# Patient Record
Sex: MALE | Race: WHITE
[De-identification: names, ages, dates, MRNs, and addresses within clinical notes are randomized per-mention and may not be internally consistent; named-entity substitution may affect disease eponyms.]

---

## 2019-11-11 ENCOUNTER — HOSPITAL ENCOUNTER (EMERGENCY)
Dept: HOSPITAL 26 - MED | Age: 44
Discharge: ANOTHER HOSPITAL | End: 2019-11-11
Payer: COMMERCIAL

## 2019-11-11 VITALS — WEIGHT: 214 LBS | BODY MASS INDEX: 28.99 KG/M2 | HEIGHT: 72 IN

## 2019-11-11 VITALS — SYSTOLIC BLOOD PRESSURE: 123 MMHG | DIASTOLIC BLOOD PRESSURE: 80 MMHG

## 2019-11-11 VITALS — DIASTOLIC BLOOD PRESSURE: 85 MMHG | SYSTOLIC BLOOD PRESSURE: 143 MMHG

## 2019-11-11 DIAGNOSIS — I10: ICD-10-CM

## 2019-11-11 DIAGNOSIS — R07.89: ICD-10-CM

## 2019-11-11 DIAGNOSIS — R00.2: Primary | ICD-10-CM

## 2019-11-11 DIAGNOSIS — F41.9: ICD-10-CM

## 2019-11-11 LAB
ALBUMIN FLD-MCNC: 3.5 G/DL (ref 3.4–5)
ANION GAP SERPL CALCULATED.3IONS-SCNC: 14.7 MMOL/L (ref 8–16)
AST SERPL-CCNC: 25 U/L (ref 15–37)
BASOPHILS # BLD AUTO: 0 K/UL (ref 0–0.22)
BASOPHILS NFR BLD AUTO: 0.6 % (ref 0–2)
BILIRUB SERPL-MCNC: 0.5 MG/DL (ref 0–1)
BUN SERPL-MCNC: 12 MG/DL (ref 7–18)
CHLORIDE SERPL-SCNC: 106 MMOL/L (ref 98–107)
CO2 SERPL-SCNC: 24.1 MMOL/L (ref 21–32)
CREAT SERPL-MCNC: 0.8 MG/DL (ref 0.7–1.3)
EOSINOPHIL # BLD AUTO: 0.1 K/UL (ref 0–0.4)
EOSINOPHIL NFR BLD AUTO: 2 % (ref 0–4)
ERYTHROCYTE [DISTWIDTH] IN BLOOD BY AUTOMATED COUNT: 14.2 % (ref 11.6–13.7)
GFR SERPL CREATININE-BSD FRML MDRD: 136 ML/MIN (ref 90–?)
GLUCOSE SERPL-MCNC: 107 MG/DL (ref 74–106)
HCT VFR BLD AUTO: 42.6 % (ref 36–52)
HGB BLD-MCNC: 14.2 G/DL (ref 12–18)
LYMPHOCYTES # BLD AUTO: 1.1 K/UL (ref 2–11.5)
LYMPHOCYTES NFR BLD AUTO: 17.8 % (ref 20.5–51.1)
MCH RBC QN AUTO: 27 PG (ref 27–31)
MCHC RBC AUTO-ENTMCNC: 33 G/DL (ref 33–37)
MCV RBC AUTO: 79.7 FL (ref 80–94)
MONOCYTES # BLD AUTO: 0.3 K/UL (ref 0.8–1)
MONOCYTES NFR BLD AUTO: 5.3 % (ref 1.7–9.3)
NEUTROPHILS # BLD AUTO: 4.7 K/UL (ref 1.8–7.7)
NEUTROPHILS NFR BLD AUTO: 74.3 % (ref 42.2–75.2)
PLATELET # BLD AUTO: 238 K/UL (ref 140–450)
POTASSIUM SERPL-SCNC: 3.8 MMOL/L (ref 3.5–5.1)
RBC # BLD AUTO: 5.35 MIL/UL (ref 4.2–6.1)
SODIUM SERPL-SCNC: 141 MMOL/L (ref 136–145)
WBC # BLD AUTO: 6.4 K/UL (ref 4.8–10.8)

## 2019-11-11 PROCEDURE — 93005 ELECTROCARDIOGRAM TRACING: CPT

## 2019-11-11 PROCEDURE — 99284 EMERGENCY DEPT VISIT MOD MDM: CPT

## 2019-11-11 PROCEDURE — 84484 ASSAY OF TROPONIN QUANT: CPT

## 2019-11-11 PROCEDURE — 85025 COMPLETE CBC W/AUTO DIFF WBC: CPT

## 2019-11-11 PROCEDURE — 71045 X-RAY EXAM CHEST 1 VIEW: CPT

## 2019-11-11 PROCEDURE — 36415 COLL VENOUS BLD VENIPUNCTURE: CPT

## 2019-11-11 PROCEDURE — 80053 COMPREHEN METABOLIC PANEL: CPT

## 2019-11-11 NOTE — NUR
Patient to be transferred to [facility].  Is being transferred due to CHEST 
PAIN.  Receiving facility has accepting physician and available space. ER 
physician has signed transfer form.  Patient or responsible party has agreed to 
transfer and signed form.  Patient belongings inventoried and will be sent with 
patient.  Copy of nursing notes, lab reports, EKG, Physicians Orders and X-rays 
to be sent with patient.  Report called to KATHARINE at receiving facility.  [facility] 
ambulance service has been called for transfer.

## 2019-11-11 NOTE — NUR
PT. APPEARED ANXIOUS WITH RAPID BREATHING. PERFORMED BREATHING EXERCISES WITH 
PATIENT. PATIENT STATED, "I FEEL BETTER NOW". HR IS 90; RR:17; SPO2 98%. WILL 
CONTINUE TO MONITOR.

## 2019-11-11 NOTE — NUR
45 Y/O MALE C/O STRONG PALPITATIONS AND DIFFICULTY BREATHING S/P MILD EXERCISE 
MINS PTA. . PATIENT STATES, " I WAS WORKING OUT AND FELT STRONG 
PALPATIONS AND STARTED FEELING SOB, FELT NAUSEATED AND A LITTLE DIZZY. I 
NOTICED  UPPER/LOWER EXTREMITY TINGLINESS; PRESSURE ON MY SOLAR PLEXUS, AND 
THIS WAS JUST WITH MILD EXERCISE." NO PAIN NOTED. PATIENT A/O X 4 AND FOLLOWS 
COMMANDS. BREATHING IS UNLABORED AND SYMMETRICAL. RR IS 16; 98% ON RA. PULSE IS 
99BPM AT THIS TIME. ERMD MADE AWARE OF STATUS. SIDE RAILSX1. PLACED ON MONITOR. 
WILL CONTINUE TO MONITOR.



HX- HTN, ANXIETY

NKDA

RX: LOSARTAN; AMLODIPINE

## 2022-03-13 ENCOUNTER — HOSPITAL ENCOUNTER (EMERGENCY)
Dept: HOSPITAL 26 - MED | Age: 47
Discharge: HOME | End: 2022-03-13
Payer: SELF-PAY

## 2022-03-13 VITALS — BODY MASS INDEX: 31.15 KG/M2 | WEIGHT: 230 LBS | HEIGHT: 72 IN

## 2022-03-13 VITALS — DIASTOLIC BLOOD PRESSURE: 90 MMHG | SYSTOLIC BLOOD PRESSURE: 160 MMHG

## 2022-03-13 VITALS — DIASTOLIC BLOOD PRESSURE: 78 MMHG | SYSTOLIC BLOOD PRESSURE: 151 MMHG

## 2022-03-13 DIAGNOSIS — I10: ICD-10-CM

## 2022-03-13 DIAGNOSIS — R07.89: Primary | ICD-10-CM

## 2022-03-13 DIAGNOSIS — Z79.899: ICD-10-CM

## 2022-03-13 DIAGNOSIS — R06.02: ICD-10-CM

## 2022-03-13 DIAGNOSIS — R11.0: ICD-10-CM

## 2022-03-13 PROCEDURE — 96374 THER/PROPH/DIAG INJ IV PUSH: CPT

## 2022-03-13 PROCEDURE — 71045 X-RAY EXAM CHEST 1 VIEW: CPT

## 2022-03-13 PROCEDURE — 99283 EMERGENCY DEPT VISIT LOW MDM: CPT

## 2022-03-13 NOTE — NUR
47 Y.O M BIB [facility] WITH C/O CHEST PAIN "PRESSURE IN THE SOLAR PLEXUS" THAT IS NOT 
RADIATING WITH PAIN 8/10 WITH S.O.B. 



PMH: PANIC DISORDER, HTN, PRE-DIABETIC

NKA

## 2022-10-12 ENCOUNTER — HOSPITAL ENCOUNTER (EMERGENCY)
Dept: HOSPITAL 26 - MED | Age: 47
Discharge: HOME | End: 2022-10-12
Payer: MEDICAID

## 2022-10-12 VITALS — SYSTOLIC BLOOD PRESSURE: 99 MMHG | DIASTOLIC BLOOD PRESSURE: 60 MMHG

## 2022-10-12 VITALS — WEIGHT: 218 LBS | HEIGHT: 72 IN | BODY MASS INDEX: 29.53 KG/M2

## 2022-10-12 VITALS — DIASTOLIC BLOOD PRESSURE: 111 MMHG | SYSTOLIC BLOOD PRESSURE: 160 MMHG

## 2022-10-12 DIAGNOSIS — Z79.899: ICD-10-CM

## 2022-10-12 DIAGNOSIS — R51.9: Primary | ICD-10-CM

## 2022-10-12 DIAGNOSIS — R06.02: ICD-10-CM

## 2022-10-12 DIAGNOSIS — R11.0: ICD-10-CM

## 2022-10-12 DIAGNOSIS — I10: ICD-10-CM

## 2022-10-12 LAB
ANION GAP SERPL CALCULATED.3IONS-SCNC: 12.5 MMOL/L (ref 8–16)
BASOPHILS # BLD AUTO: 0 K/UL (ref 0–0.22)
BASOPHILS NFR BLD AUTO: 0.2 % (ref 0–2)
BUN SERPL-MCNC: 17 MG/DL (ref 7–18)
CHLORIDE SERPL-SCNC: 106 MMOL/L (ref 98–107)
CO2 SERPL-SCNC: 25.5 MMOL/L (ref 21–32)
CREAT SERPL-MCNC: 1 MG/DL (ref 0.6–1.3)
EOSINOPHIL # BLD AUTO: 0 K/UL (ref 0–0.4)
EOSINOPHIL NFR BLD AUTO: 0.6 % (ref 0–4)
ERYTHROCYTE [DISTWIDTH] IN BLOOD BY AUTOMATED COUNT: 14.2 % (ref 11.6–13.7)
GFR SERPL CREATININE-BSD FRML MDRD: 104 ML/MIN (ref 90–?)
GLUCOSE SERPL-MCNC: 113 MG/DL (ref 74–106)
HCT VFR BLD AUTO: 46.5 % (ref 36–52)
HGB BLD-MCNC: 15.8 G/DL (ref 12–18)
LYMPHOCYTES # BLD AUTO: 1.2 K/UL (ref 2–11.5)
LYMPHOCYTES NFR BLD AUTO: 15.3 % (ref 20.5–51.1)
MCH RBC QN AUTO: 26 PG (ref 27–31)
MCHC RBC AUTO-ENTMCNC: 34 G/DL (ref 33–37)
MCV RBC AUTO: 76.9 FL (ref 80–94)
MONOCYTES # BLD AUTO: 0.3 K/UL (ref 0.8–1)
MONOCYTES NFR BLD AUTO: 3.7 % (ref 1.7–9.3)
NEUTROPHILS # BLD AUTO: 6.3 K/UL (ref 1.8–7.7)
NEUTROPHILS NFR BLD AUTO: 80.2 % (ref 42.2–75.2)
PLATELET # BLD AUTO: 220 K/UL (ref 140–450)
POTASSIUM SERPL-SCNC: 4 MMOL/L (ref 3.5–5.1)
RBC # BLD AUTO: 6.04 MIL/UL (ref 4.2–6.1)
SODIUM SERPL-SCNC: 140 MMOL/L (ref 136–145)
WBC # BLD AUTO: 7.8 K/UL (ref 4.8–10.8)

## 2022-10-12 PROCEDURE — 99284 EMERGENCY DEPT VISIT MOD MDM: CPT

## 2022-10-12 PROCEDURE — 96374 THER/PROPH/DIAG INJ IV PUSH: CPT

## 2022-10-12 PROCEDURE — 81002 URINALYSIS NONAUTO W/O SCOPE: CPT

## 2022-10-12 PROCEDURE — 85025 COMPLETE CBC W/AUTO DIFF WBC: CPT

## 2022-10-12 PROCEDURE — 36415 COLL VENOUS BLD VENIPUNCTURE: CPT

## 2022-10-12 PROCEDURE — 80048 BASIC METABOLIC PNL TOTAL CA: CPT

## 2022-10-12 PROCEDURE — 96375 TX/PRO/DX INJ NEW DRUG ADDON: CPT

## 2022-10-12 PROCEDURE — 96361 HYDRATE IV INFUSION ADD-ON: CPT

## 2022-10-12 NOTE — NUR
47/M PRESENTS TO ED WITH C/O 7/10 CONSTANT ACHING HEADACHE SINCE YESTERDAY. 
PATIENT DENIES N/V/D OR PHOTOPHOBIA, STATES HE HAS NOT BEEN COMPLIANT WITH HIS 
BP MEDICATION AND BELIEVES IT MAY BE RELATED. PATIENT DENIES CP, SOB, VISION 
CHANGES OR NUMBNESS.

## 2022-10-12 NOTE — NUR
Patient discharged with v/s stable. Written and verbal after care instructions 
given.

Patient alert, oriented and verbalized understanding of instructions. 
Ambulatory with steady gait. All questions addressed prior to discharge. ID 
band removed. Patient advised to follow up with PMD. Rx of Zofran and Ibuprofen 
given. Opportunity to ask questions provided and answered.